# Patient Record
Sex: MALE | Race: WHITE | NOT HISPANIC OR LATINO | URBAN - METROPOLITAN AREA
[De-identification: names, ages, dates, MRNs, and addresses within clinical notes are randomized per-mention and may not be internally consistent; named-entity substitution may affect disease eponyms.]

---

## 2022-11-03 ENCOUNTER — EMERGENCY (EMERGENCY)
Facility: HOSPITAL | Age: 37
LOS: 1 days | Discharge: HOME | End: 2022-11-03
Attending: STUDENT IN AN ORGANIZED HEALTH CARE EDUCATION/TRAINING PROGRAM
Payer: COMMERCIAL

## 2022-11-03 VITALS
OXYGEN SATURATION: 98 % | DIASTOLIC BLOOD PRESSURE: 90 MMHG | TEMPERATURE: 98 F | SYSTOLIC BLOOD PRESSURE: 142 MMHG | RESPIRATION RATE: 18 BRPM | HEART RATE: 76 BPM

## 2022-11-03 VITALS
HEART RATE: 61 BPM | DIASTOLIC BLOOD PRESSURE: 81 MMHG | OXYGEN SATURATION: 98 % | TEMPERATURE: 98 F | SYSTOLIC BLOOD PRESSURE: 156 MMHG | RESPIRATION RATE: 17 BRPM

## 2022-11-03 PROBLEM — Z00.00 ENCOUNTER FOR PREVENTIVE HEALTH EXAMINATION: Status: ACTIVE | Noted: 2022-11-03

## 2022-11-03 PROCEDURE — 73610 X-RAY EXAM OF ANKLE: CPT | Mod: 26,RT

## 2022-11-03 PROCEDURE — 73610 X-RAY EXAM OF ANKLE: CPT

## 2022-11-03 PROCEDURE — 29515 APPLICATION SHORT LEG SPLINT: CPT | Mod: RT

## 2022-11-03 PROCEDURE — 99283 EMERGENCY DEPT VISIT LOW MDM: CPT | Mod: 25

## 2022-11-03 PROCEDURE — 29505 APPLICATION LONG LEG SPLINT: CPT

## 2022-11-03 PROCEDURE — 73590 X-RAY EXAM OF LOWER LEG: CPT

## 2022-11-03 PROCEDURE — 73590 X-RAY EXAM OF LOWER LEG: CPT | Mod: 26,RT

## 2022-11-03 RX ORDER — IBUPROFEN 200 MG
600 TABLET ORAL ONCE
Refills: 0 | Status: COMPLETED | OUTPATIENT
Start: 2022-11-03 | End: 2022-11-03

## 2022-11-03 RX ADMIN — Medication 600 MILLIGRAM(S): at 20:44

## 2022-11-03 NOTE — ED PROVIDER NOTE - NSFOLLOWUPCLINICS_GEN_ALL_ED_FT
[facility]  Orthopedic Surgery  [address], 3rd & 4th [place] [address]  Phone: [phone]  Fax:   Follow Up Time: 4-6 Days    [facility]  Orthopedic Surgery  [address] [address]  Phone: [phone]  Fax:   Follow Up Time: 4-6 Days    [facility]  Orthopedic Surgery  [address] [place] [address]  Phone: [phone]  Fax:   Follow Up Time: 4-6 Days

## 2022-11-03 NOTE — ED PROVIDER NOTE - NSFOLLOWUPINSTRUCTIONS_ED_ALL_ED_FT
You were seen in the ED and the presumptive diagnosis is achilles tendon rupture. You got X-rays which showed posterior fat pad, consistent with the injury. The most sensitive test for this is an MRI. Please follow up with an Orthopedist for further evaluation. Take Tylenol for pain, and rest and elevate the leg. You were seen in the ED and the presumptive diagnosis is achilles tendon rupture. You got X-rays which showed posterior fat pad, consistent with the injury. The most sensitive test for this is an MRI. Please follow up with an Orthopedist for further evaluation. Take Tylenol for pain, and rest and elevate the leg. Do not put weight on the leg. Keep the splint dry do not get it wet.

## 2022-11-03 NOTE — ED ADULT NURSE NOTE - OBJECTIVE STATEMENT
38yo M denies pmh presents to ED from home with mother at bedside with c/o R ankle injury. Pt reports he was playing basketball at approx 10pm last night when he heard a "pop" in his R ankle, has not been able to bear weight on affected ankle or ambulate since. Reports "going down" when hearing the pop but did not hit his head, denies any other injuries. Endorsing 7/10 throbbing pain to the posterior ankle radiating up to the mid calf. Denies any cp, sob, headache, dizziness, or other medical complaints. On assessment in ED, pt is awake, a&ox4, generally well-appearing, spontaneous unlabored respirations, no obvious deformity swelling or discoloration noted to RLE, 2+ peripheral pulses present b/l, full ROM noted to R knee but decreased strength and ROM noted to R ankle. pt in NAD at this time. vital signs stable. Pending provider assessment. 38yo M denies pmh presents to ED from home with mother at bedside with c/o R ankle injury. Pt reports he was playing basketball at approx 10pm last night when he heard a "pop" in his R ankle/achilles area, has not been able to bear weight on affected foot or ambulate since. Reports "going down" when hearing the pop but did not hit his head, denies any other injuries. Endorsing 7/10 throbbing pain to the posterior ankle radiating up to the mid calf. Denies any cp, sob, headache, dizziness, or other medical complaints. On assessment in ED, pt is awake, a&ox4, generally well-appearing, spontaneous unlabored respirations, no obvious deformity swelling or discoloration noted to RLE, 2+ peripheral pulses present b/l, full ROM noted to R knee but decreased strength and ROM noted to R ankle. pt in NAD at this time. vital signs stable. Pending provider assessment.

## 2022-11-03 NOTE — ED PROVIDER NOTE - PHYSICAL EXAMINATION
GENERAL: well appearing in no acute distress, non-toxic appearing  CARDIAC: regular rate and rhythm, normal S1S2,   PULM: normal breath sounds, clear to ascultation bilaterally, no rales, rhonchi, wheezing  MSK: right LE achilles tendon tenderness to palpation. + dolan test right. sensation motor intact and neurovascularly intact. no pain to medial or lateral malleoli, no pain to b/l knees, full rom knees and hip b/l.   SKIN: posterior ankle redness right no abrasion or ecchymosis

## 2022-11-03 NOTE — ED PROVIDER NOTE - NS ED ROS FT
CV: denies chest pain, palpitations  MSK: right ankle pain  Neuro:  numbness, tingling  Skin: denies bruising, abrasions or any skin changes over area

## 2022-11-03 NOTE — ED PROVIDER NOTE - PATIENT PORTAL LINK FT
You can access the FollowMyHealth Patient Portal offered by [facility] by registering at the following website: [email or web address]. By joining [facility]’s FollowMyHealth portal, you will also be able to view your health information using other applications (apps) compatible with our system.

## 2022-11-03 NOTE — ED PROVIDER NOTE - ATTENDING CONTRIBUTION TO CARE
37 M w/ no pmh here w/ R ankle injury while playing basketball was playing w/ friends felt a pop in the ankle no head strike. pt states he has been trying to ambulate but has been having difficulty,  on exam, pt is awake and alert has tenderness in the ankle along the achilles tendon, he has diminished flexion and extension at the ankle has pain along the achilles tendon, pt w/ 2+ DP pulse, intact toe flexion and extension, plan for xray and splint outpt orthopedic f/u

## 2022-11-03 NOTE — ED PROVIDER NOTE - CARE PLAN
Principal Discharge DX:	Strain of Achilles tendon, right, initial encounter   1 Principal Discharge DX:	Pain and swelling of right ankle

## 2022-11-03 NOTE — ED PROVIDER NOTE - OBJECTIVE STATEMENT
38 yo m no pmhx presents to ed s/p right ankle injury while playing basketball. pt was playing last night with friends, was running and felt a pop in right ankle and fell to floor-he says due to pain, has not been able to walk since has been using cane. only took one advil last night. on exam positive dolan test right. denies any pain to medial or lateral malleoli

## 2022-11-03 NOTE — ED PROVIDER NOTE - CLINICAL SUMMARY MEDICAL DECISION MAKING FREE TEXT BOX
38 yo m no pmhx presents to ed w right achilles tendon pain and popping after playing basketball. pt heard a pop while he was running. went home took advil and rested and iced the area. +dolan test on the right, no palpable deformity, and mild erythema posterior ankle, no malleolar tenderness, no proximal joint knee or hip tenderness and full rom. xray to r/o fx of ankle/tib/fib, ibuprofen for pain, wrap for immobilization and dc home w ortho follow up for MRI imaging.

## 2022-11-10 ENCOUNTER — APPOINTMENT (OUTPATIENT)
Dept: ORTHOPEDIC SURGERY | Facility: CLINIC | Age: 37
End: 2022-11-10

## 2022-11-29 ENCOUNTER — NON-APPOINTMENT (OUTPATIENT)
Age: 37
End: 2022-11-29

## 2023-10-22 ENCOUNTER — NON-APPOINTMENT (OUTPATIENT)
Age: 38
End: 2023-10-22

## 2024-04-19 ENCOUNTER — NON-APPOINTMENT (OUTPATIENT)
Age: 39
End: 2024-04-19

## 2025-02-14 ENCOUNTER — APPOINTMENT (OUTPATIENT)
Dept: OTOLARYNGOLOGY | Facility: CLINIC | Age: 40
End: 2025-02-14
Payer: COMMERCIAL

## 2025-02-14 ENCOUNTER — NON-APPOINTMENT (OUTPATIENT)
Age: 40
End: 2025-02-14

## 2025-02-14 VITALS
SYSTOLIC BLOOD PRESSURE: 122 MMHG | HEIGHT: 69 IN | BODY MASS INDEX: 30.81 KG/M2 | WEIGHT: 208 LBS | DIASTOLIC BLOOD PRESSURE: 70 MMHG | HEART RATE: 59 BPM

## 2025-02-14 DIAGNOSIS — J31.0 CHRONIC RHINITIS: ICD-10-CM

## 2025-02-14 DIAGNOSIS — Z83.3 FAMILY HISTORY OF DIABETES MELLITUS: ICD-10-CM

## 2025-02-14 DIAGNOSIS — J32.9 CHRONIC SINUSITIS, UNSPECIFIED: ICD-10-CM

## 2025-02-14 DIAGNOSIS — Z78.9 OTHER SPECIFIED HEALTH STATUS: ICD-10-CM

## 2025-02-14 DIAGNOSIS — J34.2 DEVIATED NASAL SEPTUM: ICD-10-CM

## 2025-02-14 PROCEDURE — 31231 NASAL ENDOSCOPY DX: CPT

## 2025-02-14 PROCEDURE — 99204 OFFICE O/P NEW MOD 45 MIN: CPT | Mod: 25

## 2025-02-14 RX ORDER — DOXYCYCLINE HYCLATE 100 MG/1
100 CAPSULE ORAL
Qty: 14 | Refills: 0 | Status: ACTIVE | COMMUNITY
Start: 2025-02-14 | End: 1900-01-01

## 2025-02-14 RX ORDER — FLUTICASONE PROPIONATE 50 UG/1
50 SPRAY, METERED NASAL TWICE DAILY
Qty: 1 | Refills: 1 | Status: ACTIVE | COMMUNITY
Start: 2025-02-14 | End: 1900-01-01

## 2025-03-07 ENCOUNTER — APPOINTMENT (OUTPATIENT)
Dept: OTOLARYNGOLOGY | Facility: CLINIC | Age: 40
End: 2025-03-07